# Patient Record
Sex: FEMALE | Race: ASIAN | NOT HISPANIC OR LATINO | Employment: OTHER
[De-identification: names, ages, dates, MRNs, and addresses within clinical notes are randomized per-mention and may not be internally consistent; named-entity substitution may affect disease eponyms.]

---

## 2021-04-19 ENCOUNTER — PREPPED CHART (OUTPATIENT)
Age: 56
End: 2021-04-19

## 2021-04-22 ENCOUNTER — NEW PATIENT COMPREHENSIVE (OUTPATIENT)
Age: 56
End: 2021-04-22

## 2021-04-22 VITALS — HEIGHT: 55 IN

## 2021-04-22 DIAGNOSIS — H52.223: ICD-10-CM

## 2021-04-22 PROCEDURE — 92004 COMPRE OPH EXAM NEW PT 1/>: CPT

## 2021-04-22 PROCEDURE — 92015VEC REFRACTION-VEC

## 2021-04-22 ASSESSMENT — TONOMETRY
OD_IOP_MMHG: 19
OS_IOP_MMHG: 19

## 2021-04-22 ASSESSMENT — VISUAL ACUITY
OD_CC: 20/20-1
OS_CC: 20/20-1

## 2021-06-01 NOTE — PATIENT DISCUSSION
"Personally reviewed patients records from referring Physician. DISCUSSED AT LENGTH W PT THE DIAGNOSIS, IMAGES AND TX PLAN. PT AGREED AND ALL QUESTIONS WERE ANSWERED. RISK OF VL"" LOW. "

## 2021-06-01 NOTE — PATIENT DISCUSSION
6/1/21: PT C/O A BLEB IN THE MIDDLE OF VISION FOR 1 MO. CLINICALLY SEEMED LIKELY OND IN OD>OS BUT FAF FAILED TO SHOW DRUSEN (COULD BE BURIED). HOWEVER DEFICITS DOESN'T FULFILL CRITERIA OF OND. SINCE PT'S QOL IS NOT AFFECTED, WOULD LIKE TO MONITOR FOR NOW AFTER DISCUSSING OPTIONS OF TX INCLUDING LASER VS PPV.

## 2021-06-01 NOTE — PATIENT DISCUSSION
Patient instructed to avoid smoking, eat a healthy diet, exercise regularly if approved by their internist, monitor their weight and BMI and try and keep it in a normal range. For patients not on Coumadin eating a diet rich in green leafy vegtables is recommended.

## 2024-02-15 ENCOUNTER — COMPREHENSIVE EXAM (OUTPATIENT)
Dept: URBAN - METROPOLITAN AREA CLINIC 27 | Facility: CLINIC | Age: 59
End: 2024-02-15

## 2024-02-15 DIAGNOSIS — H52.203: ICD-10-CM

## 2024-02-15 PROCEDURE — 92015 DETERMINE REFRACTIVE STATE: CPT

## 2024-02-15 PROCEDURE — 92014 COMPRE OPH EXAM EST PT 1/>: CPT

## 2024-02-15 ASSESSMENT — VISUAL ACUITY
OU_CC: J1+
OD_CC: 20/20
OS_CC: 20/20

## 2024-02-15 ASSESSMENT — TONOMETRY
OS_IOP_MMHG: 16
OD_IOP_MMHG: 18